# Patient Record
Sex: MALE | Race: WHITE | NOT HISPANIC OR LATINO | Employment: FULL TIME | URBAN - METROPOLITAN AREA
[De-identification: names, ages, dates, MRNs, and addresses within clinical notes are randomized per-mention and may not be internally consistent; named-entity substitution may affect disease eponyms.]

---

## 2017-01-04 ENCOUNTER — OFFICE VISIT (OUTPATIENT)
Dept: GASTROENTEROLOGY | Facility: CLINIC | Age: 42
End: 2017-01-04

## 2017-01-04 ENCOUNTER — HOSPITAL ENCOUNTER (OUTPATIENT)
Facility: HOSPITAL | Age: 42
Setting detail: HOSPITAL OUTPATIENT SURGERY
End: 2017-01-04
Attending: INTERNAL MEDICINE | Admitting: INTERNAL MEDICINE

## 2017-01-04 VITALS — BODY MASS INDEX: 42.66 KG/M2 | HEIGHT: 72 IN | WEIGHT: 315 LBS

## 2017-01-04 DIAGNOSIS — R10.12 LEFT UPPER QUADRANT PAIN: ICD-10-CM

## 2017-01-04 DIAGNOSIS — K92.1 BLACK STOOL: ICD-10-CM

## 2017-01-04 DIAGNOSIS — K59.09 OTHER CONSTIPATION: ICD-10-CM

## 2017-01-04 DIAGNOSIS — K57.32 DIVERTICULITIS OF LARGE INTESTINE WITHOUT PERFORATION OR ABSCESS WITHOUT BLEEDING: Primary | ICD-10-CM

## 2017-01-04 PROCEDURE — 99204 OFFICE O/P NEW MOD 45 MIN: CPT | Performed by: INTERNAL MEDICINE

## 2017-01-04 RX ORDER — SODIUM CHLORIDE, SODIUM LACTATE, POTASSIUM CHLORIDE, CALCIUM CHLORIDE 600; 310; 30; 20 MG/100ML; MG/100ML; MG/100ML; MG/100ML
30 INJECTION, SOLUTION INTRAVENOUS CONTINUOUS
Status: CANCELLED | OUTPATIENT
Start: 2017-01-04

## 2017-01-04 RX ORDER — SODIUM CHLORIDE 0.9 % (FLUSH) 0.9 %
1-10 SYRINGE (ML) INJECTION AS NEEDED
Status: CANCELLED | OUTPATIENT
Start: 2017-01-04

## 2017-01-04 RX ORDER — OMEPRAZOLE 40 MG/1
CAPSULE, DELAYED RELEASE ORAL
Refills: 0 | COMMUNITY
Start: 2016-12-26 | End: 2017-01-29 | Stop reason: SDUPTHER

## 2017-01-04 NOTE — PROGRESS NOTES
"Chief Complaint   Patient presents with   • Abdominal Pain   • Constipation     alt diarrhea       Subjective     HPI    Milton Lutz is a 41 y.o. male with a past medical history noted below who presents for evaluation of abdominal pain and constipation.  He reports having a diagnosis of diverticulitis since 2009-- he had a colonoscopy at that time following the initial diagnosis.  He reports having a few flares over the years and requiring antibiotics 2 or 3 times for these flares.  He reports a recent flare a few weeks ago.  Symptoms of LLQ pain, constipation, low grade temp.  Pain was constant but intermittent in severity, constant ache rated 4/10 with sharp pain spikes to 7-8/10 in severity.  Associated nausea but no vomiting.  He was constipated with this.  Saw his PCP 12/9 and was given cipro and flagyl.  He improved in 2 days and then began to have normal stools-- said \"the floodgates released.\"  He has since improved.      He reports a recent episode of \"black stool\" on 12/26.  He otherwise felt well at that time. The episode was limited to that day.   Went to ER ([facility]) and was started on omeprazole, was otherwise stable and discharged.  He has been on the omeprazole since then.  No further episodes.  He does have LUQ pain that is intermittent.  Has been present for 2 years.  Varies from a 1/10 but can be more severe.  Lasts for about a day. Had a normal CT scan in [place], [place] for this in 2015. Having some issues with intermittent constipation-- has been intolerant of benefiber, miralax and therefore does not take these medications.    Last colonoscopy 2009 in [place]; he does not recall where.      Takes a daily aspirin.  No NSAIDs.  Rare ETOH, quit smoking 3 years ago. He is a . No family history of GI malignancies.      Past Medical History   Diagnosis Date   • Allergic    • Diverticulitis of colon 2009   • Diverticulosis    • GERD (gastroesophageal reflux disease)    • HL (hearing loss) "    • Hypertension    • Low back pain    • Obesity    • Visual impairment          Current Outpatient Prescriptions:   •  aspirin 81 MG tablet, Take 81 mg by mouth., Disp: , Rfl:   •  bisoprolol (ZEBETA) 5 MG tablet, Take 1 tablet by mouth daily., Disp: 30 tablet, Rfl: 11  •  omeprazole (priLOSEC) 40 MG capsule, TK ONE C PO D, Disp: , Rfl: 0  •  spironolactone (ALDACTONE) 25 MG tablet, Take 1 tablet by mouth daily., Disp: 30 tablet, Rfl: 11  •  cetirizine (ZyrTEC) 10 MG tablet, TK 1 T PO QD, Disp: , Rfl: 5  •  ciprofloxacin (CIPRO) 500 MG tablet, Take 1 tablet by mouth 2 (Two) Times a Day., Disp: 20 tablet, Rfl: 0  •  metroNIDAZOLE (FLAGYL) 500 MG tablet, Take 1 tablet by mouth 3 (Three) Times a Day., Disp: 30 tablet, Rfl: 0    Allergies   Allergen Reactions   • Penicillins        Social History     Social History   • Marital status:      Spouse name: N/A   • Number of children: N/A   • Years of education: N/A     Occupational History   • Not on file.     Social History Main Topics   • Smoking status: Former Smoker     Packs/day: 2.00     Years: 25.00     Types: Cigarettes     Quit date: 2013   • Smokeless tobacco: Not on file   • Alcohol use Yes      Comment: rarely   • Drug use: No   • Sexual activity: Defer     Other Topics Concern   • Not on file     Social History Narrative       Family History   Problem Relation Age of Onset   • Hyperlipidemia Mother    • Diabetic kidney disease Mother    • Hypertension Mother    • Stroke Mother    • Diabetes Mother    • Hyperlipidemia Father    • Diabetes Father    • Heart attack Father    • Hypertension Father        Review of Systems   Constitutional: Negative for activity change, appetite change and fatigue.   HENT: Negative for sore throat and trouble swallowing.    Eyes: Negative.    Respiratory: Negative.    Gastrointestinal: Positive for abdominal pain and constipation. Negative for abdominal distention, blood in stool, nausea and vomiting.   Endocrine: Negative  for cold intolerance and heat intolerance.   Genitourinary: Negative for difficulty urinating, dysuria and frequency.   Musculoskeletal: Negative for arthralgias, back pain and myalgias.   Skin: Negative.    Hematological: Negative for adenopathy. Does not bruise/bleed easily.   All other systems reviewed and are negative.      Objective     There were no vitals filed for this visit.    Physical Exam   Constitutional: He is oriented to person, place, and time. He appears well-developed and well-nourished. No distress.   Morbidly obese   HENT:   Head: Normocephalic and atraumatic.   Right Ear: External ear normal.   Left Ear: External ear normal.   Nose: Nose normal.   Mouth/Throat: Oropharynx is clear and moist.   Eyes: Conjunctivae and EOM are normal. Right eye exhibits no discharge. Left eye exhibits no discharge. No scleral icterus.   Neck: Normal range of motion. Neck supple. No thyromegaly present.   No supraclavicular adenopathy   Cardiovascular: Normal rate, regular rhythm, normal heart sounds and intact distal pulses.  Exam reveals no gallop.    No murmur heard.  No lower extremity edema   Pulmonary/Chest: Effort normal and breath sounds normal. No respiratory distress. He has no wheezes.   Abdominal: Soft. Normal appearance and bowel sounds are normal. He exhibits no distension and no mass. There is no hepatosplenomegaly. There is no tenderness. There is no rigidity, no rebound and no guarding.   Genitourinary:   Genitourinary Comments: Rectal exam deferred   Musculoskeletal: Normal range of motion. He exhibits no edema or tenderness.   No atrophy of upper or lower extremities.  Normal digits and nails of both hands.   Lymphadenopathy:     He has no cervical adenopathy.   Neurological: He is alert and oriented to person, place, and time. He displays no atrophy. Coordination normal.   Skin: Skin is warm and dry. No rash noted. He is not diaphoretic. No erythema.   Psychiatric: He has a normal mood and  affect. His behavior is normal. Judgment and thought content normal.   Vitals reviewed.      WBC   Date Value Ref Range Status   12/09/2016 8.70 4.50 - 10.00 10*3/mm3 Final     RBC   Date Value Ref Range Status   12/09/2016 4.74 4.00 - 6.00 10*6/mm3 Final     HEMOGLOBIN   Date Value Ref Range Status   12/09/2016 13.7 13.5 - 18.0 g/dL Final     HEMATOCRIT   Date Value Ref Range Status   12/09/2016 41.2 35.0 - 60.0 % Final     MCV   Date Value Ref Range Status   12/09/2016 87.0 80.0 - 99.0 fL Final     MCH   Date Value Ref Range Status   12/09/2016 28.9 26.1 - 33.1 pg Final     MCHC   Date Value Ref Range Status   12/09/2016 33.2 33.0 - 37.0 g/dL Final     RDW   Date Value Ref Range Status   12/09/2016 13.8 4.5 - 15.0 % Final     MPV   Date Value Ref Range Status   12/09/2016 9.0 7.1 - 10.5 fL Final     PLATELETS   Date Value Ref Range Status   12/09/2016 209 150 - 450 10*3/mm3 Final     NEUTROPHIL %   Date Value Ref Range Status   12/09/2016 76.4 (H) 42.0 - 75.0 % Final     LYMPHOCYTE %   Date Value Ref Range Status   12/09/2016 21.0 21.0 - 51.0 % Final     MONOCYTE %   Date Value Ref Range Status   12/09/2016 2.6 2.0 - 9.0 % Final     NEUTROPHILS, ABSOLUTE   Date Value Ref Range Status   12/09/2016 6.60 (H) 1.40 - 6.50 10*3/mm3 Final     LYMPHOCYTES, ABSOLUTE   Date Value Ref Range Status   12/09/2016 1.80 1.20 - 3.40 10*3/mm3 Final     MONOCYTES, ABSOLUTE   Date Value Ref Range Status   12/09/2016 0.20 0.10 - 0.60 10*3/mm3 Final       GLUCOSE   Date Value Ref Range Status   12/09/2016 99 65 - 99 mg/dL Final     SODIUM   Date Value Ref Range Status   12/09/2016 140 136 - 145 mmol/L Final     POTASSIUM   Date Value Ref Range Status   12/09/2016 4.2 3.5 - 5.2 mmol/L Final     CO2   Date Value Ref Range Status   12/09/2016 26.2 22.0 - 29.0 mmol/L Final     CHLORIDE   Date Value Ref Range Status   12/09/2016 100 98 - 107 mmol/L Final     ANION GAP   Date Value Ref Range Status   12/09/2016 13.8 mmol/L Final      CREATININE   Date Value Ref Range Status   12/09/2016 1.00 0.76 - 1.27 mg/dL Final     BUN   Date Value Ref Range Status   12/09/2016 11 6 - 20 mg/dL Final     BUN/CREATININE RATIO   Date Value Ref Range Status   12/09/2016 11.0 7.0 - 25.0 Final     CALCIUM   Date Value Ref Range Status   12/09/2016 9.5 8.6 - 10.5 mg/dL Final     EGFR NON  AMER   Date Value Ref Range Status   12/09/2016 82 >60 mL/min/1.73 Final     ALKALINE PHOSPHATASE   Date Value Ref Range Status   12/09/2016 62 39 - 117 U/L Final     TOTAL PROTEIN   Date Value Ref Range Status   12/09/2016 7.6 6.0 - 8.5 g/dL Final     ALT (SGPT)   Date Value Ref Range Status   12/09/2016 36 1 - 41 U/L Final     AST (SGOT)   Date Value Ref Range Status   12/09/2016 27 1 - 40 U/L Final     TOTAL BILIRUBIN   Date Value Ref Range Status   12/09/2016 0.7 0.1 - 1.2 mg/dL Final     ALBUMIN   Date Value Ref Range Status   12/09/2016 4.10 3.50 - 5.20 g/dL Final     GLOBULIN   Date Value Ref Range Status   12/09/2016 3.5 gm/dL Final     A/G RATIO   Date Value Ref Range Status   12/09/2016 1.2 g/dL Final         Imaging Results (last 7 days)     ** No results found for the last 168 hours. **            No notes on file    Assessment/Plan      1)history of diverticulitis: he reports that he has had symptoms over the past 6 years with a recent flare last month.  It sounds like it was the only the initial episode that he had a CAT scan confirming inflammation of the colon.  His recent flare was diagnosed by symptoms only.  However I'm puzzled because it was at a time when he had constipation which could certainly cause left lower quadrant discomfort as well.  Regardless he improved fairly quickly with antibiotics.  It is concerning that he is very young and has had about 3-4 episodes of diverticulitis; he will be predisposed to having more episodes    2)constipation: described as an intermittent problem for him.  He has tried adjusting his diet, as well as using  MiraLAX and Benefiber.  He has had side effects of diarrhea and with his job he finds it quite problematic.  I do question if these intermittent episodes could be contributing to the left lower quadrant pain that he attributes to diverticulitis    3)left upper quadrant pain; chronic intermittent complaint for him.  It sounds like he was worked up previously with a CAT scan that was reportedly normal.    4)black stool: limited episode.  He was taking a daily aspirin but was not taking any further NSAIDs.      Plan:  -EGD and colonoscopy for further workup of black stool, left upper quadrant pain, history of diverticulitis and constipation respectively; will plan for after 2/15 to give any inflammation a chance to heal  -advise an OTC fiber supplement such as Metamucil capsules to help with constipation but with less of a chance to cause diarrhea  -try to get CAT scan records from [place]    Milton was seen today for abdominal pain and constipation.    Diagnoses and all orders for this visit:    Diverticulitis of large intestine without perforation or abscess without bleeding  -     Case Request; Standing  -     Implement Anesthesia Orders Day of Procedure; Standing  -     Obtain Informed Consent; Standing  -     Verify Informed Consent; Standing  -     Insert Peripheral IV; Standing  -     Saline Lock & Maintain IV Access; Standing  -     sodium chloride 0.9 % flush 1-10 mL; Infuse 1-10 mL into a venous catheter As Needed for line care.  -     lactated ringers infusion; Infuse 30 mL/hr into a venous catheter Continuous.  -     Case Request    Other constipation    Black stool  -     Case Request; Standing  -     Implement Anesthesia Orders Day of Procedure; Standing  -     Obtain Informed Consent; Standing  -     Verify Informed Consent; Standing  -     Insert Peripheral IV; Standing  -     Saline Lock & Maintain IV Access; Standing  -     sodium chloride 0.9 % flush 1-10 mL; Infuse 1-10 mL into a venous  catheter As Needed for line care.  -     lactated ringers infusion; Infuse 30 mL/hr into a venous catheter Continuous.  -     Case Request      I have discussed the above plan with the patient.  They verbalize understanding and are in agreement with the plan.  They have been advised to contact the office for any questions, concerns, or changes related to their health.

## 2017-01-04 NOTE — PATIENT INSTRUCTIONS
Schedule EGD and colonoscopy for after February 15th    Add daily fiber capsules (ex: metamucil)    I will get records from prior CT scan    For any additional questions, concerns or changes to your condition after today's office visit please contact the office at [phone].

## 2017-01-04 NOTE — LETTER
"January 4, 2017     Markos Rivera Jr., MD  [id number] [facility] [address]    Patient: Milton Lutz   YOB: 1975   Date of Visit: 1/4/2017       Dear Dr. Miguel MD:    Thank you for referring Milton Lutz to me for evaluation. Below is a copy of my consult note.    If you have questions, please do not hesitate to call me. I look forward to following Milton along with you.         Sincerely,        Lola Bauer MD        CC: No Recipients    Chief Complaint   Patient presents with   • Abdominal Pain   • Constipation     alt diarrhea       Subjective     HPI    Milton Lutz is a 41 y.o. male with a past medical history noted below who presents for evaluation of abdominal pain and constipation.  He reports having a diagnosis of diverticulitis since 2009-- he had a colonoscopy at that time.  He reports having a few flares over the years and requiring antibiotics 2 or 3 times.  He reports a recent flare a few weeks ago.  Symptoms LLQ pain, constipation, low grade temp.  Pain was constant but intermittent in severity, constant 4/10 with spikes to 7-8/10.  Associated nausea but no vomiting.  Saw his PCP 12/9 and was given cipro and flagyl.  He improved in 2 days and then began to have normal stools.      Recent episode of \"black stool\" on 12/26.  He otherwise felt well. It was limited to that day.   Went to ER ([facility]) and was started on omeprazole, was otherwise stable and discharged.  He has been on the omeprazole since then.  No further episodes.  He does have LUQ pain that is intermittent.  Has been present for 2 years.  Varies from a 1/10 but can be more severe.  Lasts for about a day.  Having some issues with intermittent constipation-- has been intolerant of benefiber, miralax.      Last colonoscopy 2009 in [place]; he does not recall where.      Takes a daily aspirin.  No NSAIDs.  Rare ETOH, quit smoking 3 years ago. He is a . No family history of GI " malignancies.      Past Medical History   Diagnosis Date   • Allergic    • Diverticulitis of colon 2009   • Diverticulosis    • GERD (gastroesophageal reflux disease)    • HL (hearing loss)    • Hypertension    • Low back pain    • Obesity    • Visual impairment          Current Outpatient Prescriptions:   •  aspirin 81 MG tablet, Take 81 mg by mouth., Disp: , Rfl:   •  bisoprolol (ZEBETA) 5 MG tablet, Take 1 tablet by mouth daily., Disp: 30 tablet, Rfl: 11  •  omeprazole (priLOSEC) 40 MG capsule, TK ONE C PO D, Disp: , Rfl: 0  •  spironolactone (ALDACTONE) 25 MG tablet, Take 1 tablet by mouth daily., Disp: 30 tablet, Rfl: 11  •  cetirizine (ZyrTEC) 10 MG tablet, TK 1 T PO QD, Disp: , Rfl: 5  •  ciprofloxacin (CIPRO) 500 MG tablet, Take 1 tablet by mouth 2 (Two) Times a Day., Disp: 20 tablet, Rfl: 0  •  metroNIDAZOLE (FLAGYL) 500 MG tablet, Take 1 tablet by mouth 3 (Three) Times a Day., Disp: 30 tablet, Rfl: 0    Allergies   Allergen Reactions   • Penicillins        Social History     Social History   • Marital status:      Spouse name: N/A   • Number of children: N/A   • Years of education: N/A     Occupational History   • Not on file.     Social History Main Topics   • Smoking status: Former Smoker     Packs/day: 2.00     Years: 25.00     Types: Cigarettes     Quit date: 2013   • Smokeless tobacco: Not on file   • Alcohol use Yes      Comment: rarely   • Drug use: No   • Sexual activity: Defer     Other Topics Concern   • Not on file     Social History Narrative       Family History   Problem Relation Age of Onset   • Hyperlipidemia Mother    • Diabetic kidney disease Mother    • Hypertension Mother    • Stroke Mother    • Diabetes Mother    • Hyperlipidemia Father    • Diabetes Father    • Heart attack Father    • Hypertension Father        Review of Systems   Constitutional: Negative for activity change, appetite change and fatigue.   HENT: Negative for sore throat and trouble swallowing.    Eyes:  Negative.    Respiratory: Negative.    Gastrointestinal: Positive for abdominal pain and constipation. Negative for abdominal distention, blood in stool, nausea and vomiting.   Endocrine: Negative for cold intolerance and heat intolerance.   Genitourinary: Negative for difficulty urinating, dysuria and frequency.   Musculoskeletal: Negative for arthralgias, back pain and myalgias.   Skin: Negative.    Hematological: Negative for adenopathy. Does not bruise/bleed easily.   All other systems reviewed and are negative.      Objective     There were no vitals filed for this visit.    Physical Exam   Constitutional: He is oriented to person, place, and time. He appears well-developed and well-nourished. No distress.   HENT:   Head: Normocephalic and atraumatic.   Right Ear: External ear normal.   Left Ear: External ear normal.   Nose: Nose normal.   Mouth/Throat: Oropharynx is clear and moist.   Eyes: Conjunctivae and EOM are normal. Right eye exhibits no discharge. Left eye exhibits no discharge. No scleral icterus.   Neck: Normal range of motion. Neck supple. No thyromegaly present.   No supraclavicular adenopathy   Cardiovascular: Normal rate, regular rhythm, normal heart sounds and intact distal pulses.  Exam reveals no gallop.    No murmur heard.  No lower extremity edema   Pulmonary/Chest: Effort normal and breath sounds normal. No respiratory distress. He has no wheezes.   Abdominal: Soft. Normal appearance and bowel sounds are normal. He exhibits no distension and no mass. There is no hepatosplenomegaly. There is no tenderness. There is no rigidity, no rebound and no guarding.   Genitourinary:   Genitourinary Comments: Rectal exam deferred   Musculoskeletal: Normal range of motion. He exhibits no edema or tenderness.   No atrophy of upper or lower extremities.  Normal digits and nails of both hands.   Lymphadenopathy:     He has no cervical adenopathy.   Neurological: He is alert and oriented to person, place,  and time. He displays no atrophy. Coordination normal.   Skin: Skin is warm and dry. No rash noted. He is not diaphoretic. No erythema.   Psychiatric: He has a normal mood and affect. His behavior is normal. Judgment and thought content normal.   Vitals reviewed.      WBC   Date Value Ref Range Status   12/09/2016 8.70 4.50 - 10.00 10*3/mm3 Final     RBC   Date Value Ref Range Status   12/09/2016 4.74 4.00 - 6.00 10*6/mm3 Final     HEMOGLOBIN   Date Value Ref Range Status   12/09/2016 13.7 13.5 - 18.0 g/dL Final     HEMATOCRIT   Date Value Ref Range Status   12/09/2016 41.2 35.0 - 60.0 % Final     MCV   Date Value Ref Range Status   12/09/2016 87.0 80.0 - 99.0 fL Final     MCH   Date Value Ref Range Status   12/09/2016 28.9 26.1 - 33.1 pg Final     MCHC   Date Value Ref Range Status   12/09/2016 33.2 33.0 - 37.0 g/dL Final     RDW   Date Value Ref Range Status   12/09/2016 13.8 4.5 - 15.0 % Final     MPV   Date Value Ref Range Status   12/09/2016 9.0 7.1 - 10.5 fL Final     PLATELETS   Date Value Ref Range Status   12/09/2016 209 150 - 450 10*3/mm3 Final     NEUTROPHIL %   Date Value Ref Range Status   12/09/2016 76.4 (H) 42.0 - 75.0 % Final     LYMPHOCYTE %   Date Value Ref Range Status   12/09/2016 21.0 21.0 - 51.0 % Final     MONOCYTE %   Date Value Ref Range Status   12/09/2016 2.6 2.0 - 9.0 % Final     NEUTROPHILS, ABSOLUTE   Date Value Ref Range Status   12/09/2016 6.60 (H) 1.40 - 6.50 10*3/mm3 Final     LYMPHOCYTES, ABSOLUTE   Date Value Ref Range Status   12/09/2016 1.80 1.20 - 3.40 10*3/mm3 Final     MONOCYTES, ABSOLUTE   Date Value Ref Range Status   12/09/2016 0.20 0.10 - 0.60 10*3/mm3 Final       GLUCOSE   Date Value Ref Range Status   12/09/2016 99 65 - 99 mg/dL Final     SODIUM   Date Value Ref Range Status   12/09/2016 140 136 - 145 mmol/L Final     POTASSIUM   Date Value Ref Range Status   12/09/2016 4.2 3.5 - 5.2 mmol/L Final     CO2   Date Value Ref Range Status   12/09/2016 26.2 22.0 - 29.0 mmol/L  Final     CHLORIDE   Date Value Ref Range Status   12/09/2016 100 98 - 107 mmol/L Final     ANION GAP   Date Value Ref Range Status   12/09/2016 13.8 mmol/L Final     CREATININE   Date Value Ref Range Status   12/09/2016 1.00 0.76 - 1.27 mg/dL Final     BUN   Date Value Ref Range Status   12/09/2016 11 6 - 20 mg/dL Final     BUN/CREATININE RATIO   Date Value Ref Range Status   12/09/2016 11.0 7.0 - 25.0 Final     CALCIUM   Date Value Ref Range Status   12/09/2016 9.5 8.6 - 10.5 mg/dL Final     EGFR NON  AMER   Date Value Ref Range Status   12/09/2016 82 >60 mL/min/1.73 Final     ALKALINE PHOSPHATASE   Date Value Ref Range Status   12/09/2016 62 39 - 117 U/L Final     TOTAL PROTEIN   Date Value Ref Range Status   12/09/2016 7.6 6.0 - 8.5 g/dL Final     ALT (SGPT)   Date Value Ref Range Status   12/09/2016 36 1 - 41 U/L Final     AST (SGOT)   Date Value Ref Range Status   12/09/2016 27 1 - 40 U/L Final     TOTAL BILIRUBIN   Date Value Ref Range Status   12/09/2016 0.7 0.1 - 1.2 mg/dL Final     ALBUMIN   Date Value Ref Range Status   12/09/2016 4.10 3.50 - 5.20 g/dL Final     GLOBULIN   Date Value Ref Range Status   12/09/2016 3.5 gm/dL Final     A/G RATIO   Date Value Ref Range Status   12/09/2016 1.2 g/dL Final         Imaging Results (last 7 days)     ** No results found for the last 168 hours. **            No notes on file    Assessment/Plan     There are no diagnoses linked to this encounter.    I have discussed the above plan with the patient.  They verbalize understanding and are in agreement with the plan.  They have been advised to contact the office for any questions, concerns, or changes related to their health.

## 2017-01-04 NOTE — MR AVS SNAPSHOT
Milton Lutz   1/4/2017 1:15 PM   Office Visit    Dept Phone:  [phone]   Encounter #:  [id number]    Provider:  Lola Bauer MD   Department:  [facility]                Your Full Care Plan              Your Updated Medication List          This list is accurate as of: 1/4/17  1:51 PM.  Always use your most recent med list.                aspirin 81 MG tablet       bisoprolol 5 MG tablet   Commonly known as:  ZEBeta   Take 1 tablet by mouth daily.       cetirizine 10 MG tablet   Commonly known as:  zyrTEC       ciprofloxacin 500 MG tablet   Commonly known as:  CIPRO   Take 1 tablet by mouth 2 (Two) Times a Day.       metroNIDAZOLE 500 MG tablet   Commonly known as:  FLAGYL   Take 1 tablet by mouth 3 (Three) Times a Day.       omeprazole 40 MG capsule   Commonly known as:  priLOSEC       spironolactone 25 MG tablet   Commonly known as:  ALDACTONE   Take 1 tablet by mouth daily.               We Performed the Following     Case Request       You Were Diagnosed With        Codes Comments    Diverticulitis of large intestine without perforation or abscess without bleeding    -  Primary ICD-10-CM: K57.32  ICD-9-CM: 562.11     Other constipation     ICD-10-CM: K59.09  ICD-9-CM: 564.09     Black stool     ICD-10-CM: K92.1  ICD-9-CM: 792.1       Instructions    Schedule EGD and colonoscopy for after February 15th    Add daily fiber capsules (ex: metamucil)    I will get records from prior CT scan    For any additional questions, concerns or changes to your condition after today's office visit please contact the office at [phone].     Patient Instructions History      Upcoming Appointments     Visit Type Date Time Department    NEW PATIENT 1/4/2017  1:15 PM [facility]      MyChart Signup     [facility] MyChart allows you to send messages to your doctor, view your test results, renew your prescriptions, schedule appointments, and more. To sign  "up, go to [email or web address] and click on the Sign Up Now link in the New User? box. Enter your [employer] Activation Code exactly as it appears below along with the last four digits of your Social Security Number and your Date of Birth () to complete the sign-up process. If you do not sign up before the expiration date, you must request a new code.    [employer] Activation Code: [email or web address]  Expires: 2017  1:51 PM    If you have questions, you can email [email or web address] or call [phone] to talk to our [employer] staff. Remember, [employer] is NOT to be used for urgent needs. For medical emergencies, dial 911.               Other Info from Your Visit           Allergies     Penicillins        Reason for Visit     Abdominal Pain     Constipation alt diarrhea      Vital Signs     Height Weight Body Mass Index Smoking Status          72\" (182.9 cm) 425 lb (193 kg) 57.64 kg/m2 Former Smoker        Problems and Diagnoses Noted     Diverticulitis of large intestine    -  Primary    Constipation        Black stool            "

## 2017-01-05 ENCOUNTER — TELEPHONE (OUTPATIENT)
Dept: GASTROENTEROLOGY | Facility: CLINIC | Age: 42
End: 2017-01-05

## 2017-01-05 NOTE — TELEPHONE ENCOUNTER
----- Message from Lola Bauer MD sent at 1/4/2017  5:09 PM EST -----  Reports a CT scan 2015 at [facility] in [facility] for LUQ pain-- can we get pls

## 2017-01-05 NOTE — TELEPHONE ENCOUNTER
Call to [facility] at  and spoke with Mirna to request CT scan from 2015.  Mirna requests RJ be faxed to  - did so - receipt confirmed.

## 2017-01-09 NOTE — TELEPHONE ENCOUNTER
Ct Scan of abd and pelvis from [facility] received and scanned under media tab.  Dr Bauer notified.

## 2017-01-26 ENCOUNTER — TELEPHONE (OUTPATIENT)
Dept: GASTROENTEROLOGY | Facility: CLINIC | Age: 42
End: 2017-01-26

## 2017-01-26 NOTE — TELEPHONE ENCOUNTER
----- Message from Amandeep Crockett sent at 1/26/2017 11:41 AM EST -----  Regarding: RX  Contact: [phone]  DR. HAYWOOD - PT CALLED NEED A RX FOR OMEPRAZOLE 40 MG .. [facility]# [phone]

## 2017-01-26 NOTE — TELEPHONE ENCOUNTER
Call to pt.  He reports that was given Omeprazole 40 mg 1 tab po daily #30 when seen in ER, and this has worked well for him.  States that contacted PCP for refill and was advised to contact GI.  Advise pt will send message to Dr Bauer for refill, and that she is out of office until next week.  Advise pt that may obtain OTC Omeprazole in the meantime - he states will do so.

## 2017-01-29 RX ORDER — OMEPRAZOLE 40 MG/1
40 CAPSULE, DELAYED RELEASE ORAL
Qty: 30 CAPSULE | Refills: 2 | Status: SHIPPED | OUTPATIENT
Start: 2017-01-29 | End: 2017-04-26 | Stop reason: SDUPTHER

## 2017-02-20 DIAGNOSIS — K92.1 BLACK TARRY STOOLS: ICD-10-CM

## 2017-02-20 DIAGNOSIS — K57.32 DIVERTICULITIS OF LARGE INTESTINE WITHOUT PERFORATION OR ABSCESS WITHOUT BLEEDING: Primary | ICD-10-CM

## 2017-02-20 RX ORDER — SODIUM CHLORIDE 0.9 % (FLUSH) 0.9 %
1-10 SYRINGE (ML) INJECTION AS NEEDED
Status: CANCELLED | OUTPATIENT
Start: 2017-02-20

## 2017-02-20 RX ORDER — SODIUM CHLORIDE, SODIUM LACTATE, POTASSIUM CHLORIDE, CALCIUM CHLORIDE 600; 310; 30; 20 MG/100ML; MG/100ML; MG/100ML; MG/100ML
30 INJECTION, SOLUTION INTRAVENOUS CONTINUOUS
Status: CANCELLED | OUTPATIENT
Start: 2017-02-20

## 2017-02-21 ENCOUNTER — HOSPITAL ENCOUNTER (OUTPATIENT)
Facility: HOSPITAL | Age: 42
Setting detail: HOSPITAL OUTPATIENT SURGERY
Discharge: HOME | End: 2017-02-21
Attending: INTERNAL MEDICINE | Admitting: INTERNAL MEDICINE

## 2017-02-21 ENCOUNTER — ANESTHESIA EVENT (OUTPATIENT)
Dept: GASTROENTEROLOGY | Facility: HOSPITAL | Age: 42
End: 2017-02-21

## 2017-02-21 ENCOUNTER — ANESTHESIA (OUTPATIENT)
Dept: GASTROENTEROLOGY | Facility: HOSPITAL | Age: 42
End: 2017-02-21

## 2017-02-21 VITALS
SYSTOLIC BLOOD PRESSURE: 128 MMHG | WEIGHT: 315 LBS | HEART RATE: 76 BPM | DIASTOLIC BLOOD PRESSURE: 61 MMHG | HEIGHT: 73 IN | RESPIRATION RATE: 16 BRPM | TEMPERATURE: 98.9 F | OXYGEN SATURATION: 95 % | BODY MASS INDEX: 41.75 KG/M2

## 2017-02-21 DIAGNOSIS — K92.1 BLACK TARRY STOOLS: ICD-10-CM

## 2017-02-21 DIAGNOSIS — K57.32 DIVERTICULITIS OF LARGE INTESTINE WITHOUT PERFORATION OR ABSCESS WITHOUT BLEEDING: ICD-10-CM

## 2017-02-21 DIAGNOSIS — K57.32 DIVERTICULITIS OF LARGE INTESTINE WITHOUT PERFORATION OR ABSCESS WITHOUT BLEEDING: Primary | ICD-10-CM

## 2017-02-21 PROCEDURE — 43239 EGD BIOPSY SINGLE/MULTIPLE: CPT | Performed by: INTERNAL MEDICINE

## 2017-02-21 PROCEDURE — 88305 TISSUE EXAM BY PATHOLOGIST: CPT | Performed by: INTERNAL MEDICINE

## 2017-02-21 PROCEDURE — 88312 SPECIAL STAINS GROUP 1: CPT | Performed by: INTERNAL MEDICINE

## 2017-02-21 PROCEDURE — 45378 DIAGNOSTIC COLONOSCOPY: CPT | Performed by: INTERNAL MEDICINE

## 2017-02-21 PROCEDURE — 25010000002 PROPOFOL 10 MG/ML EMULSION: Performed by: ANESTHESIOLOGY

## 2017-02-21 RX ORDER — SODIUM CHLORIDE, SODIUM LACTATE, POTASSIUM CHLORIDE, CALCIUM CHLORIDE 600; 310; 30; 20 MG/100ML; MG/100ML; MG/100ML; MG/100ML
30 INJECTION, SOLUTION INTRAVENOUS CONTINUOUS
Status: DISCONTINUED | OUTPATIENT
Start: 2017-02-21 | End: 2017-02-21 | Stop reason: HOSPADM

## 2017-02-21 RX ORDER — SODIUM CHLORIDE 0.9 % (FLUSH) 0.9 %
1-10 SYRINGE (ML) INJECTION AS NEEDED
Status: CANCELLED | OUTPATIENT
Start: 2017-02-21

## 2017-02-21 RX ORDER — PROPOFOL 10 MG/ML
VIAL (ML) INTRAVENOUS AS NEEDED
Status: DISCONTINUED | OUTPATIENT
Start: 2017-02-21 | End: 2017-02-21 | Stop reason: SURG

## 2017-02-21 RX ORDER — SODIUM CHLORIDE, SODIUM LACTATE, POTASSIUM CHLORIDE, CALCIUM CHLORIDE 600; 310; 30; 20 MG/100ML; MG/100ML; MG/100ML; MG/100ML
30 INJECTION, SOLUTION INTRAVENOUS CONTINUOUS
Status: CANCELLED | OUTPATIENT
Start: 2017-02-21

## 2017-02-21 RX ORDER — PROPOFOL 10 MG/ML
VIAL (ML) INTRAVENOUS CONTINUOUS PRN
Status: DISCONTINUED | OUTPATIENT
Start: 2017-02-21 | End: 2017-02-21 | Stop reason: SURG

## 2017-02-21 RX ORDER — SODIUM CHLORIDE 0.9 % (FLUSH) 0.9 %
1-10 SYRINGE (ML) INJECTION AS NEEDED
Status: DISCONTINUED | OUTPATIENT
Start: 2017-02-21 | End: 2017-02-21 | Stop reason: HOSPADM

## 2017-02-21 RX ADMIN — SODIUM CHLORIDE, POTASSIUM CHLORIDE, SODIUM LACTATE AND CALCIUM CHLORIDE: 600; 310; 30; 20 INJECTION, SOLUTION INTRAVENOUS at 09:55

## 2017-02-21 RX ADMIN — PROPOFOL 100 MCG/KG/MIN: 10 INJECTION, EMULSION INTRAVENOUS at 10:00

## 2017-02-21 RX ADMIN — SODIUM CHLORIDE, POTASSIUM CHLORIDE, SODIUM LACTATE AND CALCIUM CHLORIDE 30 ML/HR: 600; 310; 30; 20 INJECTION, SOLUTION INTRAVENOUS at 08:48

## 2017-02-21 RX ADMIN — PROPOFOL 100 MG: 10 INJECTION, EMULSION INTRAVENOUS at 10:00

## 2017-02-21 NOTE — ANESTHESIA POSTPROCEDURE EVALUATION
Patient: Miltno Lutz    Procedure Summary     Date Anesthesia Start Anesthesia Stop Room / Location    02/21/17 0957 1039  [facility] /  [facility]       Procedure Diagnosis Surgeon Provider    COLONOSCOPY TO CECUM AND TERMINAL ILEUM (N/A ); ESOPHAGOGASTRODUODENOSCOPY WITH BIOPSIES (N/A Esophagus) Diverticulitis of large intestine without perforation or abscess without bleeding; Black tarry stools  (Diverticulitis of large intestine without perforation or abscess without bleeding [K57.32]; Black tarry stools [K92.1]) MD Leroy Wyatt MD          Anesthesia Type: MAC  Last vitals  BP      Temp      Pulse     Resp      SpO2        Post Anesthesia Care and Evaluation    Patient location during evaluation: PACU  Patient participation: complete - patient participated  Level of consciousness: awake and alert  Pain score: 0  Pain management: adequate  Airway patency: patent  Anesthetic complications: No anesthetic complications  PONV Status: noneRespiratory status: acceptable  Hydration status: acceptable

## 2017-02-21 NOTE — ANESTHESIA PREPROCEDURE EVALUATION
Anesthesia Evaluation     Patient summary reviewed and Nursing notes reviewed      Airway   Mallampati: I  TM distance: <3 FB  Neck ROM: full  no difficulty expected  Comment: beard  Dental - normal exam     Comment: Missing numerous teeth    Pulmonary - negative pulmonary ROS and normal exam   Cardiovascular - normal exam    (+) hypertension,       Neuro/Psych- negative ROS  GI/Hepatic/Renal/Endo    (+) obesity, morbid obesity, GERD,     Musculoskeletal (-) negative ROS    Abdominal  - normal exam    Bowel sounds: normal.   Substance History - negative use     OB/GYN negative ob/gyn ROS         Other                                  Anesthesia Plan    ASA 3     MAC     Anesthetic plan and risks discussed with patient.

## 2017-02-21 NOTE — H&P
"[facility], [place], operated by [facility]  Pre Procedure History & Physical    Chief Complaint:   History of diverticulitis and black stools    Subjective     HPI:   Milton Lutz is a 41 y.o. male with a past medical history noted below who presents for evaluation of abdominal pain and constipation. He reports having a diagnosis of diverticulitis since 2009-- he had a colonoscopy at that time following the initial diagnosis. He reports having a few flares over the years and requiring antibiotics 2 or 3 times for these flares. He reports a recent flare a few weeks ago. Symptoms of LLQ pain, constipation, low grade temp. Pain was constant but intermittent in severity, constant ache rated 4/10 with sharp pain spikes to 7-8/10 in severity. Associated nausea but no vomiting. He was constipated with this. Saw his PCP 12/9 and was given cipro and flagyl. He improved in 2 days and then began to have normal stools-- said \"the floodgates released.\" He has since improved.      He reports a recent episode of \"black stool\" on 12/26. He otherwise felt well at that time. The episode was limited to that day. Went to ER ([facility]) and was started on omeprazole, was otherwise stable and discharged. He has been on the omeprazole since then. No further episodes. He does have LUQ pain that is intermittent. Has been present for 2 years. Varies from a 1/10 but can be more severe. Lasts for about a day. Had a normal CT scan in [place], [place] for this in 2015. Having some issues with intermittent constipation-- has been intolerant of benefiber, miralax and therefore does not take these medications.     Last colonoscopy 2009 in [place]; he does not recall where.      Takes a daily aspirin. No NSAIDs. Rare ETOH, quit smoking 3 years ago. He is a . No family history of GI malignancies.    Past Medical History:   Past Medical History   Diagnosis Date   • Allergic    • Diverticulitis of colon 2009   • Diverticulosis    • GERD " (gastroesophageal reflux disease)    • HL (hearing loss)    • Hypertension    • Low back pain    • Obesity    • Visual impairment        Family History:  Family History   Problem Relation Age of Onset   • Hyperlipidemia Mother    • Diabetic kidney disease Mother    • Hypertension Mother    • Stroke Mother    • Diabetes Mother    • Hyperlipidemia Father    • Diabetes Father    • Heart attack Father    • Hypertension Father        Social History:   reports that he quit smoking about 4 years ago. His smoking use included Cigarettes. He has a 50.00 pack-year smoking history. He does not have any smokeless tobacco history on file. He reports that he drinks alcohol. He reports that he does not use illicit drugs.    Medications:   Prescriptions Prior to Admission   Medication Sig Dispense Refill Last Dose   • aspirin 81 MG tablet Take 81 mg by mouth.   Taking   • bisoprolol (ZEBETA) 5 MG tablet Take 1 tablet by mouth daily. 30 tablet 11 Taking   • cetirizine (ZyrTEC) 10 MG tablet TK 1 T PO QD  5 Not Taking   • ciprofloxacin (CIPRO) 500 MG tablet Take 1 tablet by mouth 2 (Two) Times a Day. 20 tablet 0 Not Taking   • metroNIDAZOLE (FLAGYL) 500 MG tablet Take 1 tablet by mouth 3 (Three) Times a Day. 30 tablet 0 Not Taking   • omeprazole (priLOSEC) 40 MG capsule Take 1 capsule by mouth Every Morning Before Breakfast. 30 capsule 2    • spironolactone (ALDACTONE) 25 MG tablet Take 1 tablet by mouth daily. 30 tablet 11 Taking       Allergies:  Penicillins    ROS:    The following systems were reviewed and negative;  constitution, respiratory, cardiovascular and gastrointestinal     Objective     There were no vitals taken for this visit.    Physical Exam   Constitutional: Pt is oriented to person, place, and time and well-developed, well-nourished, and in no distress.   HENT:   Mouth/Throat: Oropharynx is clear and moist.   Neck: Normal range of motion. Neck supple.   Cardiovascular: Normal rate, regular rhythm and normal heart  sounds.    Pulmonary/Chest: Effort normal and breath sounds normal. No respiratory distress. No  wheezes.   Abdominal: Soft. Bowel sounds are normal.   Skin: Skin is warm and dry.   Psychiatric: Mood, memory, affect and judgment normal.     Assessment/Plan     Diagnosis:  History of diverticulitis and black stools    Anticipated Surgical Procedure:  EGD and colonoscopy    The risks, benefits, and alternatives of this procedure have been discussed with the patient or the responsible party- the patient understands and agrees to proceed.

## 2017-02-21 NOTE — PLAN OF CARE
Problem: Patient Care Overview (Adult)  Goal: Adult Individualization and Mutuality  Outcome: Ongoing (interventions implemented as appropriate)  Goal: Discharge Needs Assessment  Outcome: Ongoing (interventions implemented as appropriate)    Problem: GI Endoscopy (Adult)  Goal: Signs and Symptoms of Listed Potential Problems Will be Absent or Manageable (GI Endoscopy)  Outcome: Ongoing (interventions implemented as appropriate)

## 2017-02-22 ENCOUNTER — TELEPHONE (OUTPATIENT)
Dept: GASTROENTEROLOGY | Facility: CLINIC | Age: 42
End: 2017-02-22

## 2017-02-22 LAB
CYTO UR: NORMAL
LAB AP CASE REPORT: NORMAL
Lab: NORMAL
PATH REPORT.FINAL DX SPEC: NORMAL
PATH REPORT.GROSS SPEC: NORMAL

## 2017-02-22 NOTE — TELEPHONE ENCOUNTER
----- Message from Lola Bauer MD sent at 2/22/2017  1:14 PM EST -----  The path from his EGD shows that he has a short segment of Zheng's esophagus.  There is no dysplasia.  He had mild inflammation of his stomach but no bacterial infection. He needs to continue daily omeprazole.    Repeat EGD in 3 years to monitor Zheng's.      Please place in 3 year recall.  Please have him schedule follow up with me in 3 months.

## 2017-02-22 NOTE — TELEPHONE ENCOUNTER
Called pt and advised per Dr Bauer that the path from egd showed short segment sheldon;s esohpagus. There is no dsyplagia.  Advised the pt that this is a complication from gastric reflux.  He has mild inflammation of his stomach but no bacterial infection.  He needs to continue daily omeprazole.  Advised he will need repeat egd in 3 yrs and to f/u in 3 months.      Pt verb understanding and made f/u for 04/21 at 115pm.      Message sent to Honey to place egd in recall for 02/22/2020.

## 2017-02-22 NOTE — PROGRESS NOTES
The path from his EGD shows that he has a short segment of Zheng's esophagus.  There is no dysplasia.  He had mild inflammation of his stomach but no bacterial infection. He needs to continue daily omeprazole.    Repeat EGD in 3 years to monitor Zheng's.      Please place in 3 year recall.  Please have him schedule follow up with me in 3 months.

## 2017-03-15 ENCOUNTER — TELEPHONE (OUTPATIENT)
Dept: GASTROENTEROLOGY | Facility: CLINIC | Age: 42
End: 2017-03-15

## 2017-03-15 NOTE — TELEPHONE ENCOUNTER
Pt called and reports when he had his scope Dr Bauer had mentioned he could have some medicine for spasms and he has been having some spasms. He is asking if he can try this medication.  Pt did not know name of med.  Advised would send message to Dr Bauer.

## 2017-03-16 RX ORDER — DICYCLOMINE HCL 20 MG
20 TABLET ORAL EVERY 6 HOURS PRN
Qty: 90 TABLET | Refills: 2 | Status: SHIPPED | OUTPATIENT
Start: 2017-03-16 | End: 2017-04-15

## 2017-03-16 NOTE — TELEPHONE ENCOUNTER
Called pt and advised that Dr Bauer has sent his script for dicylclomine to his pharmacy.  Pt verb understanding.

## 2017-04-26 RX ORDER — OMEPRAZOLE 40 MG/1
CAPSULE, DELAYED RELEASE ORAL
Qty: 30 CAPSULE | Refills: 10 | Status: SHIPPED | OUTPATIENT
Start: 2017-04-26

## 2017-08-14 ENCOUNTER — TELEPHONE (OUTPATIENT)
Dept: FAMILY MEDICINE CLINIC | Facility: CLINIC | Age: 42
End: 2017-08-14

## 2017-08-14 RX ORDER — SPIRONOLACTONE 25 MG/1
TABLET ORAL
Qty: 30 TABLET | Refills: 0 | Status: SHIPPED | OUTPATIENT
Start: 2017-08-14 | End: 2017-08-14 | Stop reason: SDUPTHER

## 2017-08-14 RX ORDER — BISOPROLOL FUMARATE 5 MG/1
5 TABLET, FILM COATED ORAL DAILY
Qty: 30 TABLET | Refills: 0 | Status: SHIPPED | OUTPATIENT
Start: 2017-08-14

## 2017-08-14 RX ORDER — SPIRONOLACTONE 25 MG/1
25 TABLET ORAL DAILY
Qty: 30 TABLET | Refills: 0 | Status: SHIPPED | OUTPATIENT
Start: 2017-08-14

## 2017-08-14 RX ORDER — BISOPROLOL FUMARATE 5 MG/1
TABLET, FILM COATED ORAL
Qty: 30 TABLET | Refills: 0 | Status: SHIPPED | OUTPATIENT
Start: 2017-08-14 | End: 2017-08-14 | Stop reason: SDUPTHER

## 2017-08-14 NOTE — TELEPHONE ENCOUNTER
We will do this in 1 month increments cover him for 1 month on these medicines.  Make sure that we are allowed to do this under [facility] law

## 2017-08-14 NOTE — TELEPHONE ENCOUNTER
MOVED TO [place] AND CAN NOT SEE A DR FOR AT LEAST 2 MONTHS WHEN HIS INSURANCE KICKS IN. CAN YOU CALL IN SPIRONLACTONE AND BISOPROLOL UNTIL THEN? CALL INTO [facility] AND THEY CAN TRANSFER IT.

## 2017-10-16 RX ORDER — SPIRONOLACTONE 25 MG/1
TABLET ORAL
Qty: 30 TABLET | Refills: 0 | Status: SHIPPED | OUTPATIENT
Start: 2017-10-16

## 2017-10-16 RX ORDER — BISOPROLOL FUMARATE 5 MG/1
TABLET, FILM COATED ORAL
Qty: 30 TABLET | Refills: 0 | Status: SHIPPED | OUTPATIENT
Start: 2017-10-16

## (undated) DEVICE — BITEBLOCK OMNI BLOC

## (undated) DEVICE — TUBING, SUCTION, 1/4" X 10', STRAIGHT: Brand: MEDLINE

## (undated) DEVICE — THE TORRENT IRRIGATION SCOPE CONNECTOR IS USED WITH THE TORRENT IRRIGATION TUBING TO PROVIDE IRRIGATION FLUIDS SUCH AS STERILE WATER DURING GASTROINTESTINAL ENDOSCOPIC PROCEDURES WHEN USED IN CONJUNCTION WITH AN IRRIGATION PUMP (OR ELECTROSURGICAL UNIT).: Brand: TORRENT

## (undated) DEVICE — FRCP BX RADJAW4 NDL 2.8 240CM LG OG BX40

## (undated) DEVICE — CANN NASL CO2 TRULINK W/O2 A/

## (undated) DEVICE — Device: Brand: DEFENDO AIR/WATER/SUCTION AND BIOPSY VALVE